# Patient Record
Sex: FEMALE | Race: WHITE | Employment: FULL TIME | URBAN - METROPOLITAN AREA
[De-identification: names, ages, dates, MRNs, and addresses within clinical notes are randomized per-mention and may not be internally consistent; named-entity substitution may affect disease eponyms.]

---

## 2019-07-13 LAB
CHLAMYDIA, EXTERNAL: NEGATIVE
HBSAG, EXTERNAL: NEGATIVE
HIV, EXTERNAL: NON REACTIVE
N. GONORRHEA, EXTERNAL: NEGATIVE
RUBELLA, EXTERNAL: 1.23
T. PALLIDUM, EXTERNAL: NON REACTIVE
TYPE, ABO & RH, EXTERNAL: NORMAL

## 2020-01-08 LAB — GRBS, EXTERNAL: NEGATIVE

## 2020-02-10 ENCOUNTER — HOSPITAL ENCOUNTER (INPATIENT)
Age: 25
LOS: 4 days | Discharge: HOME | End: 2020-02-14
Attending: OBSTETRICS & GYNECOLOGY | Admitting: OBSTETRICS & GYNECOLOGY
Payer: COMMERCIAL

## 2020-02-10 PROBLEM — Z3A.40 40 WEEKS GESTATION OF PREGNANCY: Status: ACTIVE | Noted: 2020-02-10

## 2020-02-10 LAB
ERYTHROCYTE [DISTWIDTH] IN BLOOD BY AUTOMATED COUNT: 14.2 % (ref 11.5–14.5)
HCT VFR BLD AUTO: 37.9 % (ref 35–47)
HGB BLD-MCNC: 11.7 G/DL (ref 11.5–16)
MCH RBC QN AUTO: 26.2 PG (ref 26–34)
MCHC RBC AUTO-ENTMCNC: 30.9 G/DL (ref 30–36.5)
MCV RBC AUTO: 84.8 FL (ref 80–99)
NRBC # BLD: 0 K/UL (ref 0–0.01)
NRBC BLD-RTO: 0 PER 100 WBC
PLATELET # BLD AUTO: 210 K/UL (ref 150–400)
PMV BLD AUTO: 12.5 FL (ref 8.9–12.9)
RBC # BLD AUTO: 4.47 M/UL (ref 3.8–5.2)
WBC # BLD AUTO: 11.8 K/UL (ref 3.6–11)

## 2020-02-10 PROCEDURE — 65270000029 HC RM PRIVATE

## 2020-02-10 PROCEDURE — 75410000002 HC LABOR FEE PER 1 HR

## 2020-02-10 PROCEDURE — 85027 COMPLETE CBC AUTOMATED: CPT

## 2020-02-10 PROCEDURE — 4A1HXCZ MONITORING OF PRODUCTS OF CONCEPTION, CARDIAC RATE, EXTERNAL APPROACH: ICD-10-PCS | Performed by: OBSTETRICS & GYNECOLOGY

## 2020-02-10 PROCEDURE — 3E0P7VZ INTRODUCTION OF HORMONE INTO FEMALE REPRODUCTIVE, VIA NATURAL OR ARTIFICIAL OPENING: ICD-10-PCS | Performed by: OBSTETRICS & GYNECOLOGY

## 2020-02-10 PROCEDURE — 74011250637 HC RX REV CODE- 250/637: Performed by: OBSTETRICS & GYNECOLOGY

## 2020-02-10 RX ORDER — DIPHENHYDRAMINE HCL 25 MG
50 CAPSULE ORAL
Status: DISCONTINUED | OUTPATIENT
Start: 2020-02-10 | End: 2020-02-14 | Stop reason: HOSPADM

## 2020-02-10 RX ORDER — OXYTOCIN/0.9 % SODIUM CHLORIDE 30/500 ML
0-25 PLASTIC BAG, INJECTION (ML) INTRAVENOUS
Status: DISCONTINUED | OUTPATIENT
Start: 2020-02-11 | End: 2020-02-14 | Stop reason: HOSPADM

## 2020-02-10 RX ORDER — ZOLPIDEM TARTRATE 5 MG/1
5 TABLET ORAL
Status: ACTIVE | OUTPATIENT
Start: 2020-02-10 | End: 2020-02-11

## 2020-02-10 RX ORDER — TERBUTALINE SULFATE 1 MG/ML
0.25 INJECTION SUBCUTANEOUS AS NEEDED
Status: DISCONTINUED | OUTPATIENT
Start: 2020-02-10 | End: 2020-02-12 | Stop reason: HOSPADM

## 2020-02-10 RX ORDER — BUTORPHANOL TARTRATE 1 MG/ML
1 INJECTION INTRAMUSCULAR; INTRAVENOUS
Status: DISCONTINUED | OUTPATIENT
Start: 2020-02-10 | End: 2020-02-12 | Stop reason: HOSPADM

## 2020-02-10 RX ADMIN — DINOPROSTONE 10 MG: 10 INSERT VAGINAL at 16:44

## 2020-02-10 NOTE — H&P
History & Physical    Name: Mendel Sprain MRN: [id number]  SSN: [id number]    YOB: 1995  Age: 22 y.o. Sex: female      Subjective:     Estimated Date of Delivery: 20  OB History    Para Term  AB Living   1             SAB TAB Ectopic Molar Multiple Live Births                    # Outcome Date GA Lbr Ruben/2nd Weight Sex Delivery Anes PTL Lv   1 Current                Ms. Dereje Dhillon is admitted with pregnancy at 40w5d for elective induction of labor at term. Prenatal course was normal.  Please see prenatal records for details. No past medical history on file. No past surgical history on file. Social History     Occupational History    Not on file   Tobacco Use    Smoking status: Never Smoker   Substance and Sexual Activity    Alcohol use: Not on file    Drug use: Not on file    Sexual activity: Not on file     Family History   Problem Relation Age of Onset    Heart Disease Father         ? jproblem in his 42's, detaills unknown       No Known Allergies  Prior to Admission medications    Medication Sig Start Date End Date Taking? Authorizing Provider   DESOGESTREL-ETHINYL ESTRADIOL (RECLIPSEN, 28, PO) Take  by mouth. Provider, Historical        Review of Systems: A comprehensive review of systems was negative except for that written in the History of Present Illness. Objective:     Vitals:  Vitals:    02/10/20 1616   Weight: 90.7 kg (200 lb)   Height: 5' 3\" (1.6 m)        Physical Exam:  Patient without distress.   Lung: normal respiratory effort  Abdomen: soft, nontender  Fundus: soft and non tender  Perineum: blood absent, amniotic fluid absent  Cervical Exam: 2/50/-3  Lower Extremities:  - Edema No  Membranes:  Intact  Fetal Heart Rate: Baseline 150, moderate variability, +accels, -decels    Prenatal Labs:   No results found for: ABORH, RUBELLAEXT, HBSAGEXT, HIVEXT, RPREXT, GONNOEXT, CHLAMEXT, ABORHEXT, RUBELLAEXT, GRBSEXT, HBSAGEXT, HIVEXT, RPREXT, GONNOEXT, CHLAMEXT  A+  GBS neg    Impression/Plan:     Active Problems:    * No active hospital problems. *    23 y/o  with IUP at 40 5/7 wks, elective IOL. GBS neg. Plan: Admit for induction of labor. Cervidil placed for ripening.  Plan pitocin in am.

## 2020-02-10 NOTE — PROGRESS NOTES
2/10/2020  4:12 PM Pt rec for post dates induction, G1, 40.5 weeks, Dr Martín Little on call, denies comp of pregnancy.  Consent signed and Pt to bathroom to change  1630 PIV and labs  1645 Dr. Martín Little at bedside, SVE, cervidil placed  1700 admision complete

## 2020-02-11 ENCOUNTER — ANESTHESIA (OUTPATIENT)
Dept: LABOR AND DELIVERY | Age: 25
End: 2020-02-11
Payer: COMMERCIAL

## 2020-02-11 ENCOUNTER — ANESTHESIA EVENT (OUTPATIENT)
Dept: LABOR AND DELIVERY | Age: 25
End: 2020-02-11
Payer: COMMERCIAL

## 2020-02-11 PROCEDURE — A4300 CATH IMPL VASC ACCESS PORTAL: HCPCS

## 2020-02-11 PROCEDURE — 74011000250 HC RX REV CODE- 250: Performed by: ANESTHESIOLOGY

## 2020-02-11 PROCEDURE — 74011250636 HC RX REV CODE- 250/636: Performed by: ANESTHESIOLOGY

## 2020-02-11 PROCEDURE — 76060000078 HC EPIDURAL ANESTHESIA

## 2020-02-11 PROCEDURE — 77030019905 HC CATH URETH INTMIT MDII -A

## 2020-02-11 PROCEDURE — 3E033VJ INTRODUCTION OF OTHER HORMONE INTO PERIPHERAL VEIN, PERCUTANEOUS APPROACH: ICD-10-PCS | Performed by: OBSTETRICS & GYNECOLOGY

## 2020-02-11 PROCEDURE — 77030014125 HC TY EPDRL BBMI -B: Performed by: ANESTHESIOLOGY

## 2020-02-11 PROCEDURE — 74011250636 HC RX REV CODE- 250/636: Performed by: OBSTETRICS & GYNECOLOGY

## 2020-02-11 PROCEDURE — 65270000029 HC RM PRIVATE

## 2020-02-11 PROCEDURE — 75410000002 HC LABOR FEE PER 1 HR

## 2020-02-11 PROCEDURE — 10907ZC DRAINAGE OF AMNIOTIC FLUID, THERAPEUTIC FROM PRODUCTS OF CONCEPTION, VIA NATURAL OR ARTIFICIAL OPENING: ICD-10-PCS | Performed by: OBSTETRICS & GYNECOLOGY

## 2020-02-11 PROCEDURE — 77030012890

## 2020-02-11 PROCEDURE — 74011250636 HC RX REV CODE- 250/636: Performed by: ADVANCED PRACTICE MIDWIFE

## 2020-02-11 PROCEDURE — 77030010848 HC CATH INTUTR PRSS KOLB -B

## 2020-02-11 PROCEDURE — 74011250636 HC RX REV CODE- 250/636

## 2020-02-11 PROCEDURE — 77030040830 HC CATH URETH FOL MDII -A

## 2020-02-11 PROCEDURE — 00HU33Z INSERTION OF INFUSION DEVICE INTO SPINAL CANAL, PERCUTANEOUS APPROACH: ICD-10-PCS | Performed by: ANESTHESIOLOGY

## 2020-02-11 RX ORDER — ONDANSETRON 2 MG/ML
INJECTION INTRAMUSCULAR; INTRAVENOUS
Status: COMPLETED
Start: 2020-02-11 | End: 2020-02-11

## 2020-02-11 RX ORDER — LIDOCAINE HYDROCHLORIDE AND EPINEPHRINE 15; 5 MG/ML; UG/ML
INJECTION, SOLUTION EPIDURAL AS NEEDED
Status: DISCONTINUED | OUTPATIENT
Start: 2020-02-11 | End: 2020-02-12 | Stop reason: HOSPADM

## 2020-02-11 RX ORDER — SODIUM CHLORIDE, SODIUM LACTATE, POTASSIUM CHLORIDE, CALCIUM CHLORIDE 600; 310; 30; 20 MG/100ML; MG/100ML; MG/100ML; MG/100ML
125 INJECTION, SOLUTION INTRAVENOUS CONTINUOUS
Status: DISCONTINUED | OUTPATIENT
Start: 2020-02-11 | End: 2020-02-14 | Stop reason: HOSPADM

## 2020-02-11 RX ORDER — NALOXONE HYDROCHLORIDE 0.4 MG/ML
0.4 INJECTION, SOLUTION INTRAMUSCULAR; INTRAVENOUS; SUBCUTANEOUS AS NEEDED
Status: DISCONTINUED | OUTPATIENT
Start: 2020-02-11 | End: 2020-02-12 | Stop reason: HOSPADM

## 2020-02-11 RX ORDER — EPHEDRINE SULFATE/0.9% NACL/PF 50 MG/5 ML
10 SYRINGE (ML) INTRAVENOUS
Status: COMPLETED | OUTPATIENT
Start: 2020-02-11 | End: 2020-02-12

## 2020-02-11 RX ORDER — ONDANSETRON 2 MG/ML
4 INJECTION INTRAMUSCULAR; INTRAVENOUS
Status: DISCONTINUED | OUTPATIENT
Start: 2020-02-11 | End: 2020-02-14 | Stop reason: HOSPADM

## 2020-02-11 RX ORDER — BUPIVACAINE HYDROCHLORIDE 2.5 MG/ML
25 INJECTION, SOLUTION EPIDURAL; INFILTRATION; INTRACAUDAL ONCE
Status: ACTIVE | OUTPATIENT
Start: 2020-02-11 | End: 2020-02-11

## 2020-02-11 RX ORDER — FENTANYL CITRATE 50 UG/ML
100 INJECTION, SOLUTION INTRAMUSCULAR; INTRAVENOUS ONCE
Status: DISPENSED | OUTPATIENT
Start: 2020-02-11 | End: 2020-02-11

## 2020-02-11 RX ORDER — BUPIVACAINE HYDROCHLORIDE 2.5 MG/ML
INJECTION, SOLUTION EPIDURAL; INFILTRATION; INTRACAUDAL
Status: COMPLETED
Start: 2020-02-11 | End: 2020-02-11

## 2020-02-11 RX ORDER — FENTANYL CITRATE 50 UG/ML
INJECTION, SOLUTION INTRAMUSCULAR; INTRAVENOUS AS NEEDED
Status: DISCONTINUED | OUTPATIENT
Start: 2020-02-11 | End: 2020-02-12 | Stop reason: HOSPADM

## 2020-02-11 RX ORDER — BUPIVACAINE HYDROCHLORIDE 2.5 MG/ML
INJECTION, SOLUTION EPIDURAL; INFILTRATION; INTRACAUDAL
Status: COMPLETED | OUTPATIENT
Start: 2020-02-11 | End: 2020-02-11

## 2020-02-11 RX ORDER — SODIUM CHLORIDE, SODIUM LACTATE, POTASSIUM CHLORIDE, CALCIUM CHLORIDE 600; 310; 30; 20 MG/100ML; MG/100ML; MG/100ML; MG/100ML
100 INJECTION, SOLUTION INTRAVENOUS CONTINUOUS
Status: DISCONTINUED | OUTPATIENT
Start: 2020-02-11 | End: 2020-02-12 | Stop reason: HOSPADM

## 2020-02-11 RX ORDER — FENTANYL/BUPIVACAINE/NS/PF 2-1250MCG
8 PREFILLED PUMP RESERVOIR EPIDURAL CONTINUOUS
Status: DISCONTINUED | OUTPATIENT
Start: 2020-02-11 | End: 2020-02-14 | Stop reason: HOSPADM

## 2020-02-11 RX ADMIN — SODIUM CHLORIDE, SODIUM LACTATE, POTASSIUM CHLORIDE, AND CALCIUM CHLORIDE 125 ML/HR: 600; 310; 30; 20 INJECTION, SOLUTION INTRAVENOUS at 13:24

## 2020-02-11 RX ADMIN — SODIUM CHLORIDE, SODIUM LACTATE, POTASSIUM CHLORIDE, AND CALCIUM CHLORIDE 125 ML/HR: 600; 310; 30; 20 INJECTION, SOLUTION INTRAVENOUS at 21:06

## 2020-02-11 RX ADMIN — BUPIVACAINE HYDROCHLORIDE 2 ML: 2.5 INJECTION, SOLUTION EPIDURAL; INFILTRATION; INTRACAUDAL; PERINEURAL at 14:15

## 2020-02-11 RX ADMIN — ONDANSETRON 4 MG: 2 INJECTION INTRAMUSCULAR; INTRAVENOUS at 18:14

## 2020-02-11 RX ADMIN — BUPIVACAINE HYDROCHLORIDE 3 ML: 2.5 INJECTION, SOLUTION EPIDURAL; INFILTRATION; INTRACAUDAL; PERINEURAL at 14:18

## 2020-02-11 RX ADMIN — Medication 10 ML/HR: at 14:23

## 2020-02-11 RX ADMIN — SODIUM CHLORIDE, SODIUM LACTATE, POTASSIUM CHLORIDE, AND CALCIUM CHLORIDE 125 ML/HR: 600; 310; 30; 20 INJECTION, SOLUTION INTRAVENOUS at 06:00

## 2020-02-11 RX ADMIN — Medication 10 ML/HR: at 23:07

## 2020-02-11 RX ADMIN — LIDOCAINE HYDROCHLORIDE,EPINEPHRINE BITARTRATE 5 ML: 15; .005 INJECTION, SOLUTION EPIDURAL; INFILTRATION; INTRACAUDAL; PERINEURAL at 14:18

## 2020-02-11 RX ADMIN — SODIUM CHLORIDE, SODIUM LACTATE, POTASSIUM CHLORIDE, AND CALCIUM CHLORIDE 300 ML: 600; 310; 30; 20 INJECTION, SOLUTION INTRAVENOUS at 18:51

## 2020-02-11 RX ADMIN — OXYTOCIN-SODIUM CHLORIDE 0.9% IV SOLN 30 UNIT/500ML 2 MILLI-UNITS/MIN: 30-0.9/5 SOLUTION at 06:10

## 2020-02-11 RX ADMIN — SODIUM CHLORIDE, SODIUM LACTATE, POTASSIUM CHLORIDE, AND CALCIUM CHLORIDE 125 ML/HR: 600; 310; 30; 20 INJECTION, SOLUTION INTRAVENOUS at 14:57

## 2020-02-11 RX ADMIN — FENTANYL CITRATE 100 MCG: 50 INJECTION, SOLUTION INTRAMUSCULAR; INTRAVENOUS at 14:18

## 2020-02-11 RX ADMIN — BUTORPHANOL TARTRATE 1 MG: 1 INJECTION, SOLUTION INTRAMUSCULAR; INTRAVENOUS at 12:53

## 2020-02-11 NOTE — PROGRESS NOTES
Patient is comfortable. FHTs 140/mod bozena/+accels/+earlies  TOCO q 2-6 minutes  Cx 5/90/-1  Pit @     Will continue with position changes.

## 2020-02-11 NOTE — PROGRESS NOTES
Received sign out from Ms. Irene Chang.  Patient is a 23 yo G1 @ 40 6/7 wks undergoing IOL for dates. Patient is comfortable at the moment.     FHTs baseline 145, mod variability, accels present, decels absent  TOCO q 2-3 minutes  Cx 3-4/50/-3  Pit @ 6    Cont to titrate pitocin  Pt ultimately plans epidural for pain control  Encouraged OOB, ambulation and position change

## 2020-02-11 NOTE — PROGRESS NOTES
RN has stopped pitocin secondary to decels, variable in nature early in timing. Moderate variability remains along with some accels. D/w patient trying an amnioinfusion. She is agreeable. IUPC placed without difficulty. Will bolus with 300 cc and then cont rate @ 75 cc/hr. Cervix is unchanged.

## 2020-02-11 NOTE — PROGRESS NOTES
0740 Bedside and Verbal shift change report given to Shan RN (oncoming nurse) by [facility]  RN (offgoing nurse). Report included the following information SBAR, Kardex, Intake/Output, MAR, Accordion, Recent Results and Med Rec Status. 2454 Dr Kassidy Ruelas at bedside to see patient and discuss plan of care. SVE 3-4/50/-3.     1236 Dr Kassidy Ruelas at bedside. SVE 4/75/-2. AROM, moderate amount of clear fluid. 1332 Patient requesting epidural, IVF bolus started. [place] 192 Dr Adolfo Patel at bedside to place epidural, patient tolerated well. 1738 SVE by Dr Kassidy Ruelas, 5/90/-1.    Berl Sermon Dr Kassidy Ruelas at bedside, IUPC placed and amnioinfusion placed. 300cc bolus followed by 75 ml/hr. 1940 Bedside and Verbal shift change report given to Genaro RN (oncoming nurse) by Shan RN (offgoing nurse). Report included the following information SBAR, Kardex, Intake/Output, MAR, Accordion, Recent Results and Med Rec Status.

## 2020-02-11 NOTE — PROGRESS NOTES
Patient is beginning to feel more cramps. Considering IV pain meds.     FHTs baseline 150, mod bozena, accels present, decels absent  TOCO difficult tracing with mobile monitor, ~q3-4 minutes  Pit @ 6  Cx 4/75/-2, AROM for mod amount of clear fluid    Cont to titrate pit  IV pain meds prn  Epidural when pt desires

## 2020-02-11 NOTE — PROGRESS NOTES
Labor Progress Note    S: Patient seen, fetal heart rate and contraction pattern evaluated. Resting comfortably in bed. Partner at bedside.      Physical Exam:  Patient Vitals for the past 4 hrs:   Temp Pulse Resp BP   02/10/20 1941 97.9 °F (36.6 °C) 98 14 118/79   02/10/20 1643 98.6 °F (37 °C) 94 14 117/83         Cervical Exam: deferred, cervidil in place  Membranes:  Intact  Uterine Contractions:  Frequency: occasional, mild  Fetal Heart Rate: Reactive, 135s, +accels, neg decels, moderate variability      Assessment/Plan:    22 y.o.  at 40w5d IUP  Elective IOL  Cat 1 tracing  GBS negative    P:  Assumed care of patient  Introduced self to patient and partner  Continue present management  Continuous monitoring  All questions asked/answered and patient agrees with plan    Christopher Cleaning CNM

## 2020-02-12 PROCEDURE — 65410000002 HC RM PRIVATE OB

## 2020-02-12 PROCEDURE — 75410000002 HC LABOR FEE PER 1 HR

## 2020-02-12 PROCEDURE — 75410000000 HC DELIVERY VAGINAL/SINGLE

## 2020-02-12 PROCEDURE — 74011000250 HC RX REV CODE- 250: Performed by: ANESTHESIOLOGY

## 2020-02-12 PROCEDURE — 75410000003 HC RECOV DEL/VAG/CSECN EA 0.5 HR

## 2020-02-12 PROCEDURE — 74011250637 HC RX REV CODE- 250/637: Performed by: MIDWIFE

## 2020-02-12 RX ORDER — HYDROCORTISONE ACETATE PRAMOXINE HCL 2.5; 1 G/100G; G/100G
CREAM TOPICAL AS NEEDED
Status: DISCONTINUED | OUTPATIENT
Start: 2020-02-12 | End: 2020-02-14 | Stop reason: HOSPADM

## 2020-02-12 RX ORDER — IBUPROFEN 400 MG/1
800 TABLET ORAL EVERY 8 HOURS
Status: DISCONTINUED | OUTPATIENT
Start: 2020-02-12 | End: 2020-02-14 | Stop reason: HOSPADM

## 2020-02-12 RX ORDER — ACETAMINOPHEN 325 MG/1
650 TABLET ORAL
Status: DISCONTINUED | OUTPATIENT
Start: 2020-02-12 | End: 2020-02-14 | Stop reason: HOSPADM

## 2020-02-12 RX ORDER — SODIUM CHLORIDE 0.9 % (FLUSH) 0.9 %
5-40 SYRINGE (ML) INJECTION AS NEEDED
Status: DISCONTINUED | OUTPATIENT
Start: 2020-02-12 | End: 2020-02-14 | Stop reason: HOSPADM

## 2020-02-12 RX ORDER — OXYTOCIN/RINGER'S LACTATE 20/1000 ML
999 PLASTIC BAG, INJECTION (ML) INTRAVENOUS AS NEEDED
Status: DISCONTINUED | OUTPATIENT
Start: 2020-02-12 | End: 2020-02-14 | Stop reason: HOSPADM

## 2020-02-12 RX ORDER — OXYCODONE AND ACETAMINOPHEN 5; 325 MG/1; MG/1
1 TABLET ORAL
Status: DISCONTINUED | OUTPATIENT
Start: 2020-02-12 | End: 2020-02-14 | Stop reason: HOSPADM

## 2020-02-12 RX ORDER — ONDANSETRON 4 MG/1
4 TABLET, ORALLY DISINTEGRATING ORAL
Status: DISCONTINUED | OUTPATIENT
Start: 2020-02-12 | End: 2020-02-14 | Stop reason: HOSPADM

## 2020-02-12 RX ORDER — AMMONIA 15 % (W/V)
1 AMPUL (EA) INHALATION AS NEEDED
Status: DISCONTINUED | OUTPATIENT
Start: 2020-02-12 | End: 2020-02-14 | Stop reason: HOSPADM

## 2020-02-12 RX ORDER — DOCUSATE SODIUM 100 MG/1
100 CAPSULE, LIQUID FILLED ORAL
Status: DISCONTINUED | OUTPATIENT
Start: 2020-02-12 | End: 2020-02-14 | Stop reason: HOSPADM

## 2020-02-12 RX ORDER — HYDROCORTISONE 1 %
CREAM (GRAM) TOPICAL AS NEEDED
Status: DISCONTINUED | OUTPATIENT
Start: 2020-02-12 | End: 2020-02-14 | Stop reason: HOSPADM

## 2020-02-12 RX ORDER — OXYTOCIN/RINGER'S LACTATE 20/1000 ML
0-25 PLASTIC BAG, INJECTION (ML) INTRAVENOUS AS NEEDED
Status: DISCONTINUED | OUTPATIENT
Start: 2020-02-12 | End: 2020-02-14 | Stop reason: HOSPADM

## 2020-02-12 RX ORDER — SIMETHICONE 80 MG
80 TABLET,CHEWABLE ORAL
Status: DISCONTINUED | OUTPATIENT
Start: 2020-02-12 | End: 2020-02-14 | Stop reason: HOSPADM

## 2020-02-12 RX ORDER — DIPHENHYDRAMINE HCL 25 MG
25 CAPSULE ORAL
Status: DISCONTINUED | OUTPATIENT
Start: 2020-02-12 | End: 2020-02-14 | Stop reason: HOSPADM

## 2020-02-12 RX ORDER — ZOLPIDEM TARTRATE 5 MG/1
5 TABLET ORAL
Status: DISCONTINUED | OUTPATIENT
Start: 2020-02-12 | End: 2020-02-14 | Stop reason: HOSPADM

## 2020-02-12 RX ADMIN — IBUPROFEN 800 MG: 400 TABLET, FILM COATED ORAL at 17:00

## 2020-02-12 RX ADMIN — IBUPROFEN 800 MG: 400 TABLET, FILM COATED ORAL at 08:27

## 2020-02-12 RX ADMIN — Medication 10 MG: at 00:22

## 2020-02-12 NOTE — PROGRESS NOTES
Bedside and Verbal shift change report given to Jean Pierre Stapleton (oncoming nurse) by CHAPINCITO Alonzo RN (offgoing nurse). Report included the following information SBAR.

## 2020-02-12 NOTE — ROUTINE PROCESS
Bedside and Verbal shift change report given to CHAPINCITO Alonzo RN (oncoming nurse) by TAI Zuniga RN (offgoing nurse).  Report included the following information SBAR

## 2020-02-12 NOTE — PROGRESS NOTES
193: Bedside and Verbal shift change report given to Mihaela Holland RN and MARK Singleton student nurse (oncoming nurse) by Tam Claudio RN (offgoing nurse). Report included the following information SBAR, Intake/Output, MAR and Recent Results. 0015: Epidural stopped. Dermatome level T4 and BP 80's/50's. Telephone order with readback from Dr Suzy Coles to decrease epidural dose to 8ml/hr and give 10mg ephedrine IV.    0205:  SVE: Complete  0215:  Begin pushing  9340-0802:  RN at bedside continuously pushing with pt. -150 with moderate variability. Variables noted with contractions that resolve spontaneously. 8692-2142:   RN at bedside continuously pushing with pt. -150 with moderate variability. Variables noted with contractions that resolve spontaneously. 6099-1199: Left sided pelvic release  5857-5632: Right sided pelvic release  5907-1497: RN at bedside. Resumed pushing with pt.  with moderate variability. Variables noted with contractions that resolve spontaneously. 8774-5167: RN at bedside. Continue pushing with pt.  with moderate variability. Variables noted with contractions that resolve spontaneously. [id number] Ferreira at bedside for delivery   5941:   vigorous female  placed on mom's chest        Faculty or Preceptor Review    2020  - Shift times - 1930 to 0700      The documentation of patient care for Angelina Andersen has been reviewed and approved.      Lucille Eden

## 2020-02-12 NOTE — LACTATION NOTE
This note was copied from a baby's chart. Baby nursing well after delivery, deep latch obtained, mother is comfortable, baby feeding vigorously with rhythmic suck, swallow, breathe pattern, both breasts offered, baby is skin to skin for feeding. Mother is very pleased with baby's progress. This is her first baby. She plans to breastfeed as long as she is able.

## 2020-02-12 NOTE — PROCEDURES
CNM Delivery Note       Patient: Delgado Jain MRN: [id number]  SSN: [id number]    YOB: 1995  Age: 22 y.o. Sex: female      Delivery Information Summary    Boyd Hutton is a 22 y.o. female admitted for 40 weeks gestation of pregnancy [Z3A.40]. Maternal History:     Patient Active Problem List    Diagnosis Date Noted    40 weeks gestation of pregnancy 02/10/2020    Chest pain on breathing 2016      Alicia Sánchez NP  Past Medical History:   Diagnosis Date    Phlebitis and thrombophlebitis       Past Surgical History:   Procedure Laterality Date    HX OTHER SURGICAL  2019    R Wrist FX     No Known Allergies   Family History   Problem Relation Age of Onset    Heart Disease Father         ? jproblem in his 42's, detaills unknown        Delivery Narrative:     Delivery Summary    Patient: Delgado Jain MRN: [id number]  SSN: [id number]    YOB: 1995  Age: 22 y.o. Sex: female       Information for the patient's :  Alida Negro [[id number]]       Labor Events:    Labor: No    Steroids: None   Cervical Ripening Date/Time: 2/10/2020 4:45 PM   Cervical Ripening Type: Cervidil   Antibiotics During Labor: No   Rupture Identifier:      Rupture Date/Time: 2020 12:36 PM   Rupture Type: AROM   Amniotic Fluid Volume: Moderate    Amniotic Fluid Description: Clear    Amniotic Fluid Odor: None    Induction: Oxytocin       Induction Date/Time: 2020 6:10 AM    Indications for Induction: Post-term Gestation    Augmentation: AROM   Augmentation Date/Time: 202012:36 PM   Indications for Augmentation: Ineffective Contraction Pattern   Labor complications:          Additional complications:        Delivery Events:  Indications For Episiotomy:     Episiotomy: None   Perineal Laceration(s): 1st   Repaired: No    Periurethral Laceration Location:      Repaired:     Labial Laceration Location:     Repaired:     Sulcal Laceration Location:     Repaired: Vaginal Laceration Location:     Repaired:     Cervical Laceration Location:     Repaired:     Repair Suture: None   Number of Repair Packets:     Estimated Blood Loss (ml): 200ml     Delivery Date: 2020    Delivery Time: 4:52 AM  Delivery Type: Vaginal, Spontaneous  Sex:  Female    Gestational Age: 37w0d   Delivery Clinician:  Wilian Cohen  Living Status: Living   Delivery Location: L&D            APGARS  One minute Five minutes Ten minutes   Skin color: 1   1        Heart rate: 2   2        Grimace: 2   2        Muscle tone: 2   2        Breathin   2        Totals: 9   9            Presentation: Vertex    Position:   Occiput Anterior  Resuscitation Method:  Tactile Stimulation     Meconium Stained: None      Cord Information: 3 Vessels  Complications: None  Cord around:    Delayed cord clamping? Yes  Cord clamped date/time:   Disposition of Cord Blood: Discard    Blood Gases Sent?: No    Placenta:  Date/Time: 2020  4:58 AM  Removal: Spontaneous      Appearance: Normal;Other (comment)     Pruden Measurements:  Birth Weight:        Birth Length:        Head Circumference:        Chest Circumference:       Abdominal Girth: Other Providers:   ;HASEEB Nascimento;;;;;;Michaelle CABRERA, Obstetrician;Primary Nurse;Primary  Nurse;Nicu Nurse;Neonatologist;Anesthesiologist;Crna;Nurse Practitioner;Midwife;Nursery Nurse           Group B Strep:   Lab Results   Component Value Date/Time    Sharrontrep, External negative 2020     Maternal A positive    Delivery Summary:                        Complete cervical dilation at 0205  of a live female infant at 200 in Occiput Anterior position under epidural anesthesia with mother in semifowlers position. Shoulders easily and spontaneously delivered with maternal effort. Vigorous infant placed skin to skin on maternal abdomen immediately following delivery.     Third Stage:  Pitocin infusion bolus after delivery of infant for active management of the third stage. Placenta and membranes: spontaneous    Placenta Appearance: intact Other marginal cord insertion, short cord  Umbilical Cord: 3 vessels present via [place] Nelli mechanism at 7445. Fundus noted as firm. Mother and infant stable, bonding, establishing breastfeeding.            Collaborative MD: Joel He      Signed By:  Stephenie Castro CNM      2/12/2020 5:16 AM

## 2020-02-12 NOTE — PROGRESS NOTES
CNM Labor Progress Note     Patient: Mendel Sprain MRN: [id number]  SSN: [id number]    YOB: 1995  Age: 22 y.o. Sex: female        Subjective:   Patient coping well with contractions with epidural; using peanut ball, sidelying;    Objective:   Blood pressure 91/53, pulse 74, temperature 98.7 °F (37.1 °C), resp. rate 16, height 5' 3\" (1.6 m), weight 90.7 kg (200 lb), SpO2 98 %, currently breastfeeding. Patient Vitals for the past 4 hrs:    Mode Fetal Heart Rate Fetal Activity Variability Decelerations Accelerations RN Reviewed Strip?   20 2330 External 140 Present 6-25 BPM None Yes Yes   20 2315 External 125     Yes   20 2300 External 135 Present 6-25 BPM  Yes Yes   20 2245 External 135     Yes   20 2231 External 140 Present 6-25 BPM None Yes Yes   20 2215 External 150     Yes   20 2200 External 125 Present 6-25 BPM Variable Yes Yes   20 2144 External 100     Yes   20 2131 External 125 Present 6-25 BPM None Yes Yes   20 2116 External 130     Yes   20 2109 External 125 (P) Present 6-25 BPM Variable Yes Yes   20 2045 External 130     Yes   20 2030 External 130 (P) Present 6-25 BPM Early;Variable Yes Yes     Uterine contractions q 2-3 minutes, 60-90s, strong to palpation, resting tone soft;    Sterile Vaginal Exam: /+1 (by NADER Mosher)    Assessment:    SIUP @ 41w0d by Estimated Date of Delivery: 20  Category 2 fetal heart rate tracing   Laboring     Plan:   Assuming care of patient from Dr. Sarah Beth Noble current orders/management    -continue amnioinfusion @ 75ml/hr   -Pain management epidural  CNM management   Anticipate     Collaborative MD: Paulette Frye    Signed By:  Darian Blount CNM      2020 12:14 AM No

## 2020-02-12 NOTE — PROGRESS NOTES
Bedside and Verbal shift change report given to Gladys Haider RN and Jr Irving (student nurse) (oncoming nurse) by Cayla Hsu RN  (offgoing nurse). Report included the following information SBAR, Kardex, Intake/Output, MAR and Recent Results. TRANSFER - OUT REPORT:    Verbal report given to CELESTINO Jacobsen RN (name) on Martinez Jennings  being transferred to [facility] (unit) for routine progression of care       Report consisted of patients Situation, Background, Assessment and   Recommendations(SBAR). Information from the following report(s) SBAR, Kardex, Intake/Output, MAR and Recent Results was reviewed with the receiving nurse. Lines:   Peripheral IV 02/10/20 Anterior; Left Forearm (Active)   Site Assessment Clean, dry, & intact 2/12/2020  4:00 AM   Phlebitis Assessment 0 2/12/2020  4:00 AM   Infiltration Assessment 0 2/12/2020  4:00 AM   Dressing Status Clean, dry, & intact 2/12/2020  4:00 AM   Dressing Type Tape;Transparent 2/12/2020  4:00 AM   Hub Color/Line Status Infusing;Green 2/12/2020  4:00 AM        Opportunity for questions and clarification was provided.       Patient transported with:   Registered Nurse

## 2020-02-12 NOTE — ROUTINE PROCESS
5982: TRANSFER - IN REPORT: 
 
Verbal report received from Mary Jo Wiley RN and CHANTALE Singleton student nurse (name) on Guillermo Crockett  being received from L&D (unit) for routine progression of care Report consisted of patients Situation, Background, Assessment and  
Recommendations(SBAR). Information from the following report(s) SBAR, Intake/Output and MAR was reviewed with the receiving nurse. Opportunity for questions and clarification was provided. Assessment completed upon patients arrival to unit and care assumed.

## 2020-02-12 NOTE — ADT AUTH CERT NOTES
Patient Demographics Patient Name Roberta Fischer [address] 
[id number] Sex Female  
1995 Address [place] Phone [phone] (Home) [phone] (Mobile) CSN:  
[id number] Admit Date: Admit Time Room Bed Feb 10, 2020  4:12  [[id number]] 01 [[id number]] Attending Providers Provider Pager From To  
Ricardo Abarca MD  02/10/20 02/12/20 Lady Negro MD  20 Emergency Contact(s) Name Relation Home Work Mobile None, None Unknown [phone] Utilization Reviews  
 
   
DOS 2/10/2020 IOL note by Curt Fischer  
 
   
Review Entered Review Status 2020 08:09 In Primary  
   
Criteria Review 2020 IOL note VS 98.6 94 14 117/83 WBC 11.8 (H)  
  
Cervadil Ms. Avila is admitted with pregnancy at 40w5d for elective induction of labor at term. Prenatal course was normal.  Please see prenatal records for details. 
  
Physical Exam: 
Mary Ano. Lung: normal respiratory effort Abdomen: soft, nontender Fundus: soft and non tender Perineum: blood absent, amniotic fluid absent Cervical Exam: 50/-3 Lower Extremities:  - Edema No 
Membranes:  Intact Fetal Heart Rate: Baseline 150, moderate variability, +accels, -decels Impression/Plan:  
  
Active Problems: 
  * No active hospital problems. * 
  
23 y/o  with IUP at 40 5/7 wks, elective IOL. GBS neg.  
  
Plan: Admit for induction of labor. Cervidil placed for ripening. Plan pitocin in am.  
  
  
   
H&P Notes  
 
 H&P by Ricardo Abarca MD at 02/10/20 1619 documented on Admission (Current) from 2/10/2020 in [address] Author: Ricardo Abarca MD Author Type: Physician Filed: 02/10/20 8933 Note Status: Signed Cosign: Cosign Not Required Date of Service: 02/10/20 1619 : Ricardo Abarca MD (Physician) History & Physical 
  
Name: Mendel Sprain MRN: [id number]  SSN: [id number] YOB: 1995  Age: 22 y.o. Sex: female   
  
Subjective:   Estimated Date of Delivery: 20 OB History  Para Term  AB Living 1 SAB TAB Ectopic Molar Multiple Live Births   
             
   
# Outcome Date GA Lbr Ruben/2nd Weight Sex Delivery Anes PTL Lv  
1 Current                    
  
  
Ms. America Betancourt is admitted with pregnancy at 40w5d for elective induction of labor at term. Prenatal course was normal.  Please see prenatal records for details. 
  
No past medical history on file. No past surgical history on file. Social History  
  
    
Occupational History  Not on file Tobacco Use  Smoking status: Never Smoker Substance and Sexual Activity  Alcohol use: Not on file  Drug use: Not on file  Sexual activity: Not on file  
  
     
Family History Problem Relation Age of Onset  Heart Disease Father    
      ? jproblem in his 42's, detaills unknown  
  
  
No Known Allergies Prior to Admission medications Medication Sig Start Date End Date Taking? Authorizing Provider DESOGESTREL-ETHINYL ESTRADIOL (RECLIPSEN, 28, PO) Take  by mouth.       Provider, Historical  
  
  
Review of Systems: A comprehensive review of systems was negative except for that written in the History of Present Illness. 
  
Objective:  
  
Vitals: 
   
Vitals:  
  02/10/20 1616 Weight: 90.7 kg (200 lb) Height: 5' 3\" (1.6 m)  
  
  
Physical Exam: 
Patient without distress. Lung: normal respiratory effort Abdomen: soft, nontender Fundus: soft and non tender Perineum: blood absent, amniotic fluid absent Cervical Exam: 250/-3 Lower Extremities:  - Edema No 
Membranes:  Intact Fetal Heart Rate: Baseline 150, moderate variability, +accels, -decels 
  
Prenatal Labs:  
No results found for: ABORH, RUBELLAEXT, HBSAGEXT, HIVEXT, RPREXT, GONNOEXT, CHLAMEXT, ABORHEXT, RUBELLAEXT, GRBSEXT, HBSAGEXT, HIVEXT, RPREXT, GONNOEXT, CHLAMEXT A+ 
GBS neg 
  
Impression/Plan:  
  
Active Problems: * No active hospital problems. * 
  
21 y/o  with IUP at 40 5/7 wks, elective IOL. GBS neg.  
  
Plan: Admit for induction of labor. Cervidil placed for ripening. Plan pitocin in am.  
  
   
  
 
 
 
MOM CHART- DELIVERED 20 VAGINAL Liban Zayas  
   
MRN: [id number] Link to [employer] Comment Last edited by  on  at [de-identified] name MRN Account  Age Sex Admission Idolina Severin Angel Daisy [id number] 20 0 days F Confirmed Admission - L&D [place] OB History Jaunita Candi 1 Para 1 Term 1   
0 AB  
0 Living  
1 SAB  
0  
 TAB  
0 Ectopic  
0 Molar  
0 Multiple  
0 Live Births 1  
  
   
# Outcome Date GA Labor/2nd Weight Sex Delivery Anes PTL Lv A1 A5  
1 Term 20 41w0d 12h 05m / 2h 47m 3.075 kg F Vag-Spont Epidural N Living 9 9 Name: Eleuteriokrysta Analisa Location: Other Delivering Clinician: Mei Benoit CNM Prenatal History Most Recent Value Did You Receive Prenatal Care Yes Name Of [facility] Provider Maribel Garza Seen By M (Maternal Fetal Medicine)? No  
Fetal Ultrasound Abnormalities/Concerns? No  
Infant Feeding Breast Milk Circumcision Planned No  
Pediatrician After Birth/ Follow Up Baby Visits undecided Dating Summary Working VERNELL: 20 set by Skip Razo RN on 02/10/20 based on Other Basis Based On VERNELL GA Dif GA User Date Other Basis  20 Working  Skip Razo RN 02/10/20 Prenatal Results Prenatal Labs Test Value Date Time ABO/Rh * A positive  19 Antibody Scrn Hgb Hct Platelet Rubella * 1.23  19 RPR     
T. Pallidum Antibody * non reactive  19 Urine Hep B Surf AG * negative  19 Hep C     
HIV * non reactive  19 Gonorrhea * negative  19 Chlamydia * negative  19 TSH     
GTT, 1 HR (Glucola) GTT, Fasting GTT, 1 HR     
GTT, 2 HR     
GTT, 3 HR     
3rd Trimester Test Value Date Time Hgb Hct Platelet Group B Strep * negative  20 Antibody Scrn     
TSH     
T. Pallidum Antibody Hep B Surf AG Gonorrhea Chlamydia Screening/Diagnostics Test Value Date Time  
AFP Only AFP Tetra Hgb Electrophoresis Amniocentesis Cystic Fibrosis Thalessemia Rafa-Sachs [place] PAP Smear FREE BETA HCG     
NT     
NIPT Additional Results Test Value Date Time GTT, Fasting GTT, 1HR     
GTT, 2HR     
GTT, 3HR     
RPR FREE BETA HCG     
CMV AB     
TOXOPLASMA AB     
VARICELLA ZOSTER AB Legend *: Historical  
View all results for this pregnancy Vicenta pearson [[id number]]  Delivery Birth date/time: 2020 04:52:00 Delivery type: Vaginal, Spontaneous Labor Event Times Labor onset date/time: 2020 1400 Dilation complete date/time: 2020 0205 Start pushing date/time: 2020 0215 Labor Events  labor?: No  
 steroids?: None Cervical ripenin/10/2020 1645 Cervical ripening type: Cervidil Antibiotics during labor?: No  
Rupture date/time: 2020 1236 Rupture type: AROM Fluid color: Clear Fluid odor: None Induction: Oxytocin Induction date/time: 2020 Induction indications: Post-term Gestation Augmentation: AROM Augmentation date/time: 2020 123 Augmentation indications: Ineffective Contraction Pattern Labor/Delivery complications: None Delivery Providers Delivering clinician: Imelda Robert CNM Provider Role Obstetrician  
Marko Severin Primary Nurse Joanie Gray, RN Primary [place] Nurse NICU Nurse Neonatologist  
 Anesthesiologist  
 CRNA Nurse Practitioner Imelda Robert CNM Midwife Nursery Nurse Chloe Chisholm Student Nurse Anesthesia Method: Epidural  
Multiple Birth Onset Second Stage Second Stage Start Date: 2/12/20 Second Stage Start Time: 0205 Operative Delivery Forceps attempted?: No  
Vacuum extractor attempted?: No  
Presentation Presentation: Vertex Position: Occiput Anterior Apgars Living status: Living Apgars:  1 min. :  5 min.:  10 min. :  15 min.:  20 min.:   
Skin color:  1  1 Heart rate:  2  2 Reflex irritability:  2  2 Muscle tone:  2  2 Respiratory effort:  2  2 Total:  9  9 Apgars assigned by: Doreen CR RN Resuscitation Method: Tactile Stimulation Shoulder Dystocia Shoulder dystocia present?: No  
   
   
   
   
   
   
   
   
   
   
   
   
   
   
   
   
   
   
   
   
Measurements Weight: 3075 g Length: 50.8 cm Head circumference: 34 cm Abdominal girth: 30 cm Lacerations Episiotomy: None Lacerations: 1st  
Repair Needed: None # of Repair Packets:   
Suture Type and Size:   
Suture Comment:   
Estimated Blood Loss (mL):    
Placenta Placenta Date/Time: 2/12/2020 8313 Removal: Spontaneous Appearance: Normal, Other (comment) Placenta disposition: Discarded Placenta Appearance Comment: marginal cord insertion, short cord Vaginal Counts Initial count personnel: Lisa GRIGGS RN Final count personnel: BECCA CABRERA CNM/ANALIA MINAYA Accurate final count?: Yes  
Skin to Skin Skin to skin initiated date/time: 2/12/2020 0452 Skin to skin with: Mother Delivery Summary History Event User Date/Time Signed Elan Strickland RN 2/12/2020 09:34:30 Opened for Addendum Elan Strickland RN 2/12/2020 09:36:09 Signed Elan Strickland RN 2/12/2020 09:37:00

## 2020-02-12 NOTE — PROGRESS NOTES
Post-Partum Day Number 0 Progress Note    Yudelka Sana Maura     Assessment: Doing well, post partum day 0 s/p TSVD at [address]     Plan:  1. Continue routine postpartum and perineal care as well as maternal education. 2.Female neonte, doing well    Information for the patient's :  Harper Gonzalez [[id number]]   Vaginal, Spontaneous    Subjective:  Patient doing well without significant complaint. Voiding without difficulty, normal lochia. Vitals:  Visit Vitals  /79 (BP 1 Location: Left arm, BP Patient Position: At rest)   Pulse 76   Temp 97.7 °F (36.5 °C)   Resp 18   Ht 5' 3\" (1.6 m)   Wt 90.7 kg (200 lb)   SpO2 96%   Breastfeeding Yes   BMI 35.43 kg/m²     Temp (24hrs), Av.4 °F (36.9 °C), Min:97.5 °F (36.4 °C), Max:98.7 °F (37.1 °C)        Exam:   Patient without distress. Abdomen soft, fundus firm, nontender                Perineum with normal lochia noted. Lower extremities are negative for swelling, cords or tenderness. Labs:     Lab Results   Component Value Date/Time    WBC 11.8 (H) 02/10/2020 04:38 PM    HGB 11.7 02/10/2020 04:38 PM    HCT 37.9 02/10/2020 04:38 PM    PLATELET 799  04:38 PM       No results found for this or any previous visit (from the past 24 hour(s)).

## 2020-02-13 PROCEDURE — 74011250637 HC RX REV CODE- 250/637: Performed by: MIDWIFE

## 2020-02-13 PROCEDURE — 65410000002 HC RM PRIVATE OB

## 2020-02-13 RX ORDER — IBUPROFEN 800 MG/1
800 TABLET ORAL
Qty: 30 TAB | Refills: 1 | Status: SHIPPED | OUTPATIENT
Start: 2020-02-13

## 2020-02-13 RX ADMIN — IBUPROFEN 800 MG: 400 TABLET, FILM COATED ORAL at 12:10

## 2020-02-13 RX ADMIN — IBUPROFEN 800 MG: 400 TABLET, FILM COATED ORAL at 20:05

## 2020-02-13 RX ADMIN — IBUPROFEN 800 MG: 400 TABLET, FILM COATED ORAL at 03:31

## 2020-02-13 NOTE — ROUTINE PROCESS
Verbal shift change report given to TAI Kaufman RN (oncoming nurse) by Hadley De La Cruz RN (offgoing nurse) since patient was sleeping. Report included the following information SBAR.

## 2020-02-13 NOTE — PROGRESS NOTES
Post-Partum Day Number 1 Progress Note    Yudelka Lechuga     Assessment: Doing well, post partum day 1    Plan:  1. Continue routine postpartum and perineal care as well as maternal education. 2. Stable LE varicosities. Heat, NSAIDs reviewed. Consider outpt follow up with vascular surgeon postpartum. 3. Anticipate discharge home in AM.     Information for the patient's :  Candido Patel [[id number]]   Vaginal, Spontaneous   Patient doing well without significant complaint. Voiding without difficulty, normal lochia. Varicose veins doing better. Vitals:  Visit Vitals  BP 91/88   Pulse 73   Temp 98.1 °F (36.7 °C)   Resp 18   Ht 5' 3\" (1.6 m)   Wt 90.7 kg (200 lb)   SpO2 96%   Breastfeeding Yes   BMI 35.43 kg/m²     Temp (24hrs), Av.1 °F (36.7 °C), Min:97.3 °F (36.3 °C), Max:98.6 °F (37 °C)        Exam:   Patient without distress. Abdomen soft, fundus firm, nontender                Perineum with normal lochia noted. Lower extremities are negative for swelling, cords or tenderness. Labs:     Lab Results   Component Value Date/Time    WBC 11.8 (H) 02/10/2020 04:38 PM    HGB 11.7 02/10/2020 04:38 PM    HCT 37.9 02/10/2020 04:38 PM    PLATELET 575  04:38 PM       No results found for this or any previous visit (from the past 24 hour(s)).

## 2020-02-13 NOTE — DISCHARGE INSTRUCTIONS
POSTPARTUM DISCHARGE INSTRUCTIONS       Name:  Maddy Solis  YOB: 1995  Admission Diagnosis:  40 weeks gestation of pregnancy [Z3A.40]     Discharge Diagnosis:    Problem List as of 2/13/2020 Date Reviewed: 5/12/2016          Codes Class Noted - Resolved    40 weeks gestation of pregnancy ICD-10-CM: Z3A.40  ICD-9-CM: V22.2  2/10/2020 - Present        Chest pain on breathing ICD-10-CM: R07.1  ICD-9-CM: 786.52  5/12/2016 - Present            Attending Physician:  Melo Fleming MD    Delivery Type:  Vaginal Childbirth: What To Expect At Home    Your Recovery: Your body will slowly heal in the next few weeks. It is easy to get too tired and overwhelmed during the first weeks after your baby is born. Changes in your hormones can shift your mood without warning. You may find it hard to meet the extra demands on your energy and time. Take it easy on yourself. Follow-up care is a key part of your treatment and safety. Be sure to make and go to all appointments, and call your doctor if you are having problems. It's also a good idea to know your test results and keep a list of the medicines you take. How can you care for yourself at home? Vaginal bleeding and cramps  · After delivery, you will have a bloody discharge from the vagina. This will turn pink within a week and then white or yellow after about 10 days. It may last for 2 to 4 weeks or longer, until the uterus has healed. Use pads instead of tampons until you stop bleeding. · Do not worry if you pass some blood clots, as long as they are smaller than a golf ball. If you have a tear or stitches in your vaginal area, change the pad at least every 4 hours to prevent soreness and infection. · You may have cramps for the first few days after childbirth. These are normal and occur as the uterus shrinks to normal size.  Take an over-the-counter pain medicine, such as acetaminophen (Tylenol), ibuprofen (Advil, Motrin), or naproxen (Aleve), for cramps. Read and follow all instructions on the label. Do not take aspirin, because it can cause more bleeding. Do not take acetaminophen (Tylenol) and other acetaminophen containing medications (i.e. Percocet) at the same time. Breast fullness  · Your breasts may overfill (engorge) in the first few days after delivery. To help milk flow and to relieve pain, warm your breasts in the shower or by using warm, moist towels before nursing. · If you are not nursing, do not put warmth on your breasts or touch your breasts. Wear a tight bra or sports bra and use ice until the fullness goes away. This usually takes 2 to 3 days. · Put ice or a cold pack on your breast after nursing to reduce swelling and pain. Put a thin cloth between the ice and your skin. Activity  · Eat a balanced diet. Do not try to lose weight by cutting calories. Keep taking your prenatal vitamins, or take a multivitamin. · Get as much rest as you can. Try to take naps when your baby sleeps during the day. · Get some exercise every day. But do not do any heavy exercise until your doctor says it is okay. · Wait until you are healed (about 4 to 6 weeks) before you have sexual intercourse. Your doctor will tell you when it is okay to have sex. · Talk to your doctor about birth control. You can get pregnant even before your period returns. Also, you can get pregnant while you are breast-feeding. Mental Health  · Many women get the \"baby blues\" during the first few days after childbirth. You may lose sleep, feel irritable, and cry easily. You may feel happy one minute and sad the next. Hormone changes are one cause of these emotional changes. Also, the demands of a new baby, along with visits from relatives or other family needs, add to a mother's stress. The \"baby blues\" often peak around the fourth day. Then they ease up in less than 2 weeks.   · If your moodiness or anxiety lasts for more than 2 weeks, or if you feel like life is not worth living, you may have postpartum depression. This is different for each mother. Some mothers with serious depression may worry intensely about their infant's well-being. Others may feel distant from their child. Some mothers might even feel that they might harm their baby. A mother may have signs of paranoia, wondering if someone is watching her. · With all the changes in your life, you may not know if you are depressed. Pregnancy sometimes causes changes in how you feel that are similar to the symptoms of depression. · Symptoms of depression include:  · Feeling sad or hopeless and losing interest in daily activities. These are the most common symptoms of depression. · Sleeping too much or not enough. · Feeling tired. You may feel as if you have no energy. · Eating too much or too little. · POSTPARTUM SUPPORT INTERNATIONAL (PSI) offers a Warm line; Chat with the Expert phone sessions; Information and Articles about Pregnancy and Postpartum Mood Disorders; Comprehensive List of Free Support Groups; Knowledgeable local coordinators who will offer support, information, and resources; Guide to Resources on [employer]; Calendar of events in the  mood disorders community; Latest News and Research; and [facility] [address] for [employer]. Remember - You are not alone; You are not to blame; With help, you will be well. 3-457-609-PPD(9749). WWW. POSTPARTUM. NET   · Writing or talking about death, such as writing suicide notes or talking about guns, knives, or pills. Keep the numbers for these national suicide hotlines: 8-432-907-TALK (0-958.945.9101) and 1-120-PYYDIFG (8-213.256.3405). If you or someone you know talks about suicide or feeling hopeless, get help right away. Constipation and Hemorrhoids  · Drink plenty of fluids, enough so that your urine is light yellow or clear like water.  If you have kidney, heart, or liver disease and have to limit fluids, talk with your doctor before you increase the amount of fluids you drink. · Eat plenty of fiber each day. Have a bran muffin or bran cereal for breakfast, and try eating a piece of fruit for a mid-afternoon snack. · For painful, itchy hemorrhoids, put ice or a cold pack on the area several times a day for 10 minutes at a time. Follow this by putting a warm compress on the area for another 10 to 20 minutes or by sitting in a shallow, warm bath. When should you call for help? Call 911 anytime you think you may need emergency care. For example, call if:  · You are thinking of hurting yourself, your baby, or anyone else. · You passed out (lost consciousness). · You have symptoms of a blood clot in your lung (called a pulmonary embolism). These may include:    · Sudden chest pain. · Trouble breathing. · Coughing up blood. Call your doctor now or seek immediate medical care if:  · You have severe vaginal bleeding. · You are soaking through a pad each hour for 2 or more hours. · Your vaginal bleeding seems to be getting heavier or is still bright red 4 days after delivery. · You are dizzy or lightheaded, or you feel like you may faint. · You are vomiting or cannot keep fluids down. · You have a fever. · You have new or more belly pain. · You pass tissue (not just blood). · Your vaginal discharge smells bad. · Your belly feels tender or full and hard. · Your breasts are continuously painful or red. · You feel sad, anxious, or hopeless for more than a few days. · You have sudden, severe pain in your belly. · You have symptoms of a blood clot in your leg (called a deep vein thrombosis),          such as:  · Pain in your calf, back of the knee, thigh, or groin. · Redness and swelling in your leg or groin. · You have symptoms of preeclampsia, such as:  · Sudden swelling of your face, hands, or feet. · New vision problems (such as dimness or blurring). · A severe headache.   · Your blood pressure is higher than it should be or rises suddenly. · You have new nausea or vomiting. Watch closely for changes in your health, and be sure to contact your doctor if you have any problems. Additional Information:  Postpartum Support    PARENTS:  Are you feeling sad or depressed? Is it difficult for you to enjoy yourself? Do you feel more irritable or tense? Do you feel anxious or panicky? Are you having difficulty bonding with your baby? Do you feel as if you are \"out of control\" or \"going crazy\"? Are you worried that you might hurt your baby or yourself? FAMILIES: Do you worry that something is wrong but don't know how to help? Do you think that your partner or spouse is having problems coping? Are you worried that it may never get better? While many women experience some mild mood change or \"the blues\" during or after the birth of a child, 1 in 9 women experience more significant symptoms of depression or anxiety. 1 in 10 Dads become depressed during the first year. Things you can do  Being a good parent includes taking care of yourself. If you take care of yourself, you will be able to take better care of your baby and your family. · Talk to a counselor or healthcare provider who has training in  mood and anxiety problems. · Learn as much as you can about pregnancy and postpartum depression and anxiety. · Get support from family and friends. Ask for help when you need it. · Join a support group in your area or online. · Keep active by walking, stretching or whatever form of exercise helps you to feel better. · Get enough rest and time for yourself. · Eat a healthy diet. · Don't give up! It may take more than one try to get the right help you need. These are general instructions for a healthy lifestyle:    No smoking/ No tobacco products/ Avoid exposure to second hand smoke    Surgeon General's Warning:  Quitting smoking now greatly reduces serious risk to your health.     Obesity, smoking, and sedentary lifestyle greatly increases your risk for illness    A healthy diet, regular physical exercise & weight monitoring are important for maintaining a healthy lifestyle    Recognize signs and symptoms of STROKE:    F-face looks uneven    A-arms unable to move or move unevenly    S-speech slurred or non-existent    T-time-call 911 as soon as signs and symptoms begin - DO NOT go       back to bed or wait to see if you get better - TIME IS BRAIN. I have had the opportunity to make my options or choices for discharge. I have received and understand these instructions.

## 2020-02-13 NOTE — DISCHARGE SUMMARY
Obstetrical Discharge Summary     Name: Patria Schmidt MRN: [id number]  SSN: [id number]    YOB: 1995  Age: 22 y.o. Sex: female      Admit Date: 2/10/2020    Discharge Date: 2020    Admitting Physician: Slava Duncan MD     Attending Physician:  Jesse Jimenes MD     Admission Diagnoses: 40 weeks gestation of pregnancy [Z3A.40]    Discharge Diagnoses:   Information for the patient's :  Luz Maria Chavez [[id number]]   Delivery of a 3.075 kg female infant via Vaginal, Spontaneous on 2020 at 4:52 AM  by Joe Harrison. Apgars were 9  and 9 . Additional Diagnoses:   Hospital Problems  Date Reviewed: 2016          Codes Class Noted POA    40 weeks gestation of pregnancy ICD-10-CM: Z3A.40  ICD-9-CM: V22.2  2/10/2020 Unknown             Lab Results   Component Value Date/Time    Rubella, External 1.23 2019    GrBStrep, External negative 2020       Hospital Course: Normal hospital course following the delivery. Patient Instructions:   Current Discharge Medication List      START taking these medications    Details   ibuprofen (MOTRIN) 800 mg tablet Take 1 Tab by mouth every six (6) hours as needed for Pain. Qty: 30 Tab, Refills: 1         STOP taking these medications       DESOGESTREL-ETHINYL ESTRADIOL (RECLIPSEN, 28, PO) Comments:   Reason for Stopping:               Disposition at Discharge: Home or self care    Condition at Discharge: Stable    Reference my discharge instructions.     Follow-up Appointments   Procedures    FOLLOW UP VISIT Appointment in: 6 Weeks     Standing Status:   Standing     Number of Occurrences:   1     Order Specific Question:   Appointment in     Answer:   6 Weeks        Signed By:  Thomas Josue MD     2020

## 2020-02-13 NOTE — ROUTINE PROCESS
Bedside shift change report given to Queenie Willingham RN orienting BRAEDEN Lux RN (oncoming nurse) by Donald Childress. Sharyle Cheng RN (offgoing nurse). Report included the following information SBAR.  
 
0700 I have reviewed documentation and in agreement.

## 2020-02-13 NOTE — LACTATION NOTE
This note was copied from a baby's chart. Mom states baby has been latching and nursing well. Mom was breast feeding when I went in to see her. I gave mom some tips on positioning the baby at the breast and helped her get her latched in the cross cradle hold. Mom was putting her fingers close to the end of her nipple and trying to get baby latched. I showed her how to hold further back on the breast and try to elicit the root reflex but rubbing her nipple under baby's nose. We were able to get the baby latched deeply. She began sucking rhythmically with the occassional swallow noted. Mom will not limit the time the baby is at the breast. She will allow the baby to completely finish one breast and then offer the second breast at each feeding. Mom will call out for assistance as needed.

## 2020-02-14 VITALS
OXYGEN SATURATION: 96 % | HEIGHT: 63 IN | TEMPERATURE: 98.2 F | DIASTOLIC BLOOD PRESSURE: 75 MMHG | SYSTOLIC BLOOD PRESSURE: 108 MMHG | BODY MASS INDEX: 35.44 KG/M2 | RESPIRATION RATE: 18 BRPM | HEART RATE: 80 BPM | WEIGHT: 200 LBS

## 2020-02-14 PROCEDURE — 74011250637 HC RX REV CODE- 250/637: Performed by: MIDWIFE

## 2020-02-14 RX ADMIN — IBUPROFEN 800 MG: 400 TABLET, FILM COATED ORAL at 08:39

## 2020-02-14 NOTE — ROUTINE PROCESS
Bedside shift change report given to Irina Olivares RN (oncoming nurse) by Mackenzie Mcnair RN (offgoing nurse). Report included the following information SBAR.

## 2020-02-14 NOTE — LACTATION NOTE
This note was copied from a baby's chart. Baby nursing well and has improved throughout post partum stay, deep latch maintained, mother is comfortable, milk is in transition, baby feeding vigorously with rhythmic suck, swallow, breathe pattern, with audible swallowing, and evident milk transfer, both breasts offerd, baby is asleep following feeding. Baby is feeding on demand, voiding and stools present as appropriate over the last 24 hours. Mother states that she has no further questions for Lactation Consultant before discharge.

## 2020-02-14 NOTE — PROGRESS NOTES
Post-Partum Day Number 2 Progress Note    Yudelka Ahmadi     Assessment: Doing well, post partum day 2    Plan:   1. Discharge home today  2. Follow up in office in 6 weeks with Vita Thibodeaux MD  3. Post partum activity advised, diet as tolerated  4. Discharge Medications: ibuprofen, and medications prior to admission    Information for the patient's :  Robin Ling [[id number]]   Vaginal, Spontaneous   Patient doing well without significant complaint. Voiding without difficulty, normal lochia. Vitals:  Visit Vitals  /75   Pulse 80   Temp 98.2 °F (36.8 °C)   Resp 18   Ht 5' 3\" (1.6 m)   Wt 90.7 kg (200 lb)   SpO2 96%   Breastfeeding Yes   BMI 35.43 kg/m²     Temp (24hrs), Av.3 °F (36.8 °C), Min:98 °F (36.7 °C), Max:98.5 °F (36.9 °C)      Exam:         Patient without distress. Abdomen soft, fundus firm, nontender                 Lower extremities are negative for swelling, cords or tenderness. Labs:     Lab Results   Component Value Date/Time    WBC 11.8 (H) 02/10/2020 04:38 PM    HGB 11.7 02/10/2020 04:38 PM    HCT 37.9 02/10/2020 04:38 PM    PLATELET 345  04:38 PM       No results found for this or any previous visit (from the past 24 hour(s)).

## 2020-12-01 NOTE — PROGRESS NOTES
1930: Bedside and Verbal shift change report given to PITO Zuleta RN (oncoming nurse) by MAYO Lopez (offgoing nurse). Report included the following information SBAR, Intake/Output, MAR and Recent Results. 0445: EFM removed. Cervidil removed and discarded by pt, pt up to shower. 0545: Pt back to bed, EFM reapplied. 2580: Pitocin started. 8783: Bedside and Verbal shift change report given to ASHELY Plaza RN (oncoming nurse) by [employer]. Mirna Zuleta RN (offgoing nurse). Report included the following information SBAR, Intake/Output, MAR and Recent Results. Patient has not been here in a long time and she has never seen the provider before. She will need to wait until her appt to discuss

## 2021-10-07 ENCOUNTER — APPOINTMENT (OUTPATIENT)
Dept: GENERAL RADIOLOGY | Age: 26
End: 2021-10-07
Attending: PHYSICIAN ASSISTANT
Payer: COMMERCIAL

## 2021-10-07 ENCOUNTER — HOSPITAL ENCOUNTER (EMERGENCY)
Age: 26
Discharge: HOME | End: 2021-10-07
Attending: EMERGENCY MEDICINE
Payer: COMMERCIAL

## 2021-10-07 VITALS
WEIGHT: 220 LBS | OXYGEN SATURATION: 99 % | RESPIRATION RATE: 18 BRPM | BODY MASS INDEX: 38.98 KG/M2 | SYSTOLIC BLOOD PRESSURE: 121 MMHG | TEMPERATURE: 96.8 F | HEIGHT: 63 IN | DIASTOLIC BLOOD PRESSURE: 77 MMHG | HEART RATE: 93 BPM

## 2021-10-07 DIAGNOSIS — R07.9 CHEST PAIN, UNSPECIFIED TYPE: ICD-10-CM

## 2021-10-07 DIAGNOSIS — R00.2 PALPITATIONS: Primary | ICD-10-CM

## 2021-10-07 DIAGNOSIS — F41.1 ANXIETY STATE: ICD-10-CM

## 2021-10-07 LAB
ALBUMIN SERPL-MCNC: 3.6 G/DL (ref 3.5–5)
ALBUMIN/GLOB SERPL: 0.8 {RATIO} (ref 1.1–2.2)
ALP SERPL-CCNC: 88 U/L (ref 45–117)
ALT SERPL-CCNC: 30 U/L (ref 12–78)
ANION GAP SERPL CALC-SCNC: 9 MMOL/L (ref 5–15)
AST SERPL-CCNC: 16 U/L (ref 15–37)
ATRIAL RATE: 89 BPM
BASOPHILS # BLD: 0.1 K/UL (ref 0–0.1)
BASOPHILS NFR BLD: 0 % (ref 0–1)
BILIRUB SERPL-MCNC: 0.3 MG/DL (ref 0.2–1)
BUN SERPL-MCNC: 11 MG/DL (ref 6–20)
BUN/CREAT SERPL: 14 (ref 12–20)
CALCIUM SERPL-MCNC: 9.3 MG/DL (ref 8.5–10.1)
CALCULATED P AXIS, ECG09: 39 DEGREES
CALCULATED R AXIS, ECG10: 58 DEGREES
CALCULATED T AXIS, ECG11: 34 DEGREES
CHLORIDE SERPL-SCNC: 105 MMOL/L (ref 97–108)
CO2 SERPL-SCNC: 24 MMOL/L (ref 21–32)
COMMENT, HOLDF: NORMAL
CREAT SERPL-MCNC: 0.79 MG/DL (ref 0.55–1.02)
D DIMER PPP FEU-MCNC: 0.58 MG/L FEU (ref 0–0.65)
DIAGNOSIS, 93000: NORMAL
DIFFERENTIAL METHOD BLD: ABNORMAL
EOSINOPHIL # BLD: 0.1 K/UL (ref 0–0.4)
EOSINOPHIL NFR BLD: 1 % (ref 0–7)
ERYTHROCYTE [DISTWIDTH] IN BLOOD BY AUTOMATED COUNT: 13.1 % (ref 11.5–14.5)
GLOBULIN SER CALC-MCNC: 4.7 G/DL (ref 2–4)
GLUCOSE SERPL-MCNC: 85 MG/DL (ref 65–100)
HCG SERPL QL: NEGATIVE
HCT VFR BLD AUTO: 44.8 % (ref 35–47)
HGB BLD-MCNC: 14.1 G/DL (ref 11.5–16)
IMM GRANULOCYTES # BLD AUTO: 0 K/UL (ref 0–0.04)
IMM GRANULOCYTES NFR BLD AUTO: 0 % (ref 0–0.5)
LYMPHOCYTES # BLD: 3.1 K/UL (ref 0.8–3.5)
LYMPHOCYTES NFR BLD: 25 % (ref 12–49)
MCH RBC QN AUTO: 28.2 PG (ref 26–34)
MCHC RBC AUTO-ENTMCNC: 31.5 G/DL (ref 30–36.5)
MCV RBC AUTO: 89.6 FL (ref 80–99)
MONOCYTES # BLD: 0.5 K/UL (ref 0–1)
MONOCYTES NFR BLD: 4 % (ref 5–13)
NEUTS SEG # BLD: 8.7 K/UL (ref 1.8–8)
NEUTS SEG NFR BLD: 70 % (ref 32–75)
NRBC # BLD: 0 K/UL (ref 0–0.01)
NRBC BLD-RTO: 0 PER 100 WBC
P-R INTERVAL, ECG05: 126 MS
PLATELET # BLD AUTO: 265 K/UL (ref 150–400)
PMV BLD AUTO: 12.2 FL (ref 8.9–12.9)
POTASSIUM SERPL-SCNC: 3.8 MMOL/L (ref 3.5–5.1)
PROT SERPL-MCNC: 8.3 G/DL (ref 6.4–8.2)
Q-T INTERVAL, ECG07: 370 MS
QRS DURATION, ECG06: 72 MS
QTC CALCULATION (BEZET), ECG08: 450 MS
RBC # BLD AUTO: 5 M/UL (ref 3.8–5.2)
SAMPLES BEING HELD,HOLD: NORMAL
SODIUM SERPL-SCNC: 138 MMOL/L (ref 136–145)
TROPONIN-HIGH SENSITIVITY: <4 NG/L (ref 0–51)
TSH SERPL DL<=0.05 MIU/L-ACNC: 3.51 UIU/ML (ref 0.36–3.74)
VENTRICULAR RATE, ECG03: 89 BPM
WBC # BLD AUTO: 12.5 K/UL (ref 3.6–11)

## 2021-10-07 PROCEDURE — 71046 X-RAY EXAM CHEST 2 VIEWS: CPT

## 2021-10-07 PROCEDURE — 36415 COLL VENOUS BLD VENIPUNCTURE: CPT

## 2021-10-07 PROCEDURE — 93005 ELECTROCARDIOGRAM TRACING: CPT

## 2021-10-07 PROCEDURE — 84443 ASSAY THYROID STIM HORMONE: CPT

## 2021-10-07 PROCEDURE — 84703 CHORIONIC GONADOTROPIN ASSAY: CPT

## 2021-10-07 PROCEDURE — 80053 COMPREHEN METABOLIC PANEL: CPT

## 2021-10-07 PROCEDURE — 85379 FIBRIN DEGRADATION QUANT: CPT

## 2021-10-07 PROCEDURE — 99283 EMERGENCY DEPT VISIT LOW MDM: CPT

## 2021-10-07 PROCEDURE — 85025 COMPLETE CBC W/AUTO DIFF WBC: CPT

## 2021-10-07 PROCEDURE — 84484 ASSAY OF TROPONIN QUANT: CPT

## 2021-10-07 RX ORDER — IBUPROFEN 800 MG/1
800 TABLET ORAL
Qty: 20 TABLET | Refills: 0 | Status: SHIPPED | OUTPATIENT
Start: 2021-10-07

## 2021-10-07 RX ORDER — HYDROXYZINE PAMOATE 25 MG/1
25-50 CAPSULE ORAL
Qty: 12 CAPSULE | Refills: 0 | Status: SHIPPED | OUTPATIENT
Start: 2021-10-07

## 2021-10-07 NOTE — ED PROVIDER NOTES
27yo with PMH of palpitations since 2016 presents with continued palpitations that have incrased in frequency the past 2 weeks. Previously was once every 1-2 weeks but now once a day. She states when she lays in the fetal position it occurs and states today it still feels \"not right\" and deep pain on the left side of the chest 11am today while she was unloading boxes at work. She endorses \"when my anxiety is up I feel the palpitations more\". She states she has been feeling increased anxiety. No headache, fever, cough, URI sx's, chest congestion, chest pain, N/V/D. Later states pain is also reproducible when rolling over in bed. Saw cardiology Dr. Lian Kendrick 5/2016, and she did a 30 day holter monitor and was told it was normal with no follow-up.     LMP- 9/16/21, denies pregnancy chance; takes an estrogen pill           Past Medical History:   Diagnosis Date    Phlebitis and thrombophlebitis        Past Surgical History:   Procedure Laterality Date    DELIVERY VAGINAL  2/12/2020         HX OTHER SURGICAL  2019    R Wrist FX         Family History:   Problem Relation Age of Onset    Heart Disease Father         ? jproblem in his 42's, detaills unknown       Social History     Socioeconomic History    Marital status: SINGLE     Spouse name: Not on file    Number of children: Not on file    Years of education: Not on file    Highest education level: Not on file   Occupational History    Not on file   Tobacco Use    Smoking status: Never Smoker   Substance and Sexual Activity    Alcohol use: Not Currently     Alcohol/week: 0.0 standard drinks    Drug use: Never    Sexual activity: Yes     Partners: Male     Birth control/protection: Pill   Other Topics Concern     Service Not Asked    Blood Transfusions Not Asked    Caffeine Concern Not Asked    Occupational Exposure Not Asked    Hobby Hazards Not Asked    Sleep Concern Not Asked    Stress Concern Not Asked    Weight Concern Not Asked    Special Diet Not Asked    Back Care Not Asked    Exercise Not Asked    Bike Helmet Not Asked   [address] Not Asked    Self-Exams Not Asked   Social History Narrative    Not on file     Social Determinants of Health     Financial Resource Strain:     Difficulty of Paying Living Expenses:    Food Insecurity:     Worried About Running Out of Food in the Last Year:     Ran Out of Food in the Last Year:    Transportation Needs:     Lack of Transportation (Medical):  Lack of Transportation (Non-Medical):    Physical Activity:     Days of Exercise per Week:     Minutes of Exercise per Session:    Stress:     Feeling of Stress :    Social Connections:     Frequency of Communication with Friends and Family:     Frequency of Social Gatherings with Friends and Family:     Attends [employer]oration[employer] Services:     Active Member of Clubs or Organizations:     Attends Club or Organization Meetings:     Marital Status:    Intimate Partner Violence:     Fear of Current or Ex-Partner:     Emotionally Abused:     Physically Abused:     Sexually Abused: ALLERGIES: Patient has no known allergies. Review of Systems    Vitals:    10/07/21 1243   BP: 121/77   Pulse: 93   Resp: 18   Temp: 96.8 °F (36 °C)   SpO2: 99%   Weight: 99.8 kg (220 lb)   Height: 5' 3\" (1.6 m)            Physical Exam  Vitals and nursing note reviewed. Constitutional:       General: She is not in acute distress. Appearance: She is well-developed. She is not toxic-appearing or diaphoretic. Comments: WF, elevated BMI   HENT:      Head: Normocephalic and atraumatic. Eyes:      General:         Right eye: No discharge. Left eye: No discharge. Conjunctiva/sclera: Conjunctivae normal.      Pupils: Pupils are equal, round, and reactive to light. Neck:      Trachea: No tracheal tenderness. Cardiovascular:      Rate and Rhythm: Normal rate and regular rhythm. Pulses: Normal pulses.       Heart sounds: Normal heart sounds. No murmur heard. No friction rub. No gallop. Pulmonary:      Effort: Pulmonary effort is normal. No respiratory distress. Breath sounds: Normal breath sounds. No wheezing or rales. Chest:      Chest wall: Tenderness present. Abdominal:      General: Bowel sounds are normal. There is no distension. Palpations: Abdomen is soft. Tenderness: There is no abdominal tenderness. There is no guarding or rebound. Musculoskeletal:         General: No tenderness. Normal range of motion. Cervical back: Full passive range of motion without pain and normal range of motion. Skin:     General: Skin is warm and dry. Capillary Refill: Capillary refill takes less than 2 seconds. Findings: No abrasion, erythema or rash. Neurological:      Mental Status: She is alert and oriented to person, place, and time. Cranial Nerves: No cranial nerve deficit. Sensory: No sensory deficit. Coordination: Coordination normal.   Psychiatric:         Speech: Speech normal.         Behavior: Behavior normal.          MDM  Number of Diagnoses or Management Options  Anxiety state  Chest pain, unspecified type  Palpitations  Diagnosis management comments:   Ddx: musculoskeletal chest pain, ACS, arrhythmia, PNA, viral illness       Amount and/or Complexity of Data Reviewed  Clinical lab tests: ordered and reviewed  Tests in the radiology section of CPT®: ordered and reviewed  Review and summarize past medical records: yes  Independent visualization of images, tracings, or specimens: yes    Patient Progress  Patient progress: stable         Procedures      EKG interpretation:   Rhythm: normal sinus rhythm; and regular . Rate (approx.): 89; Axis: normal; P wave: normal; QRS interval: normal ; ST/T wave: normal;       DISCHARGE NOTE:  6:00 PM  The patient has been re-evaluated and feeling much better and are stable for discharge.   All available radiology and laboratory results have been reviewed with patient and/or available family. Patient and/or family verbally conveyed their understanding and agreement of the patient's signs, symptoms, diagnosis, treatment and prognosis and additionally agree to follow-up as recommended in the discharge instructions or to return to the Emergency Department should their condition change or worsen prior to their follow-up appointment. All questions have been answered and patient and/or available family express understanding. LABORATORY RESULTS:  Recent Results (from the past 12 hour(s))   EKG, 12 LEAD, INITIAL    Collection Time: 10/07/21 12:53 PM   Result Value Ref Range    Ventricular Rate 89 BPM    Atrial Rate 89 BPM    P-R Interval 126 ms    QRS Duration 72 ms    Q-T Interval 370 ms    QTC Calculation (Bezet) 450 ms    Calculated P Axis 39 degrees    Calculated R Axis 58 degrees    Calculated T Axis 34 degrees    Diagnosis       Normal sinus rhythm  Normal ECG  No previous ECGs available  Confirmed by Koby Mora M.D., Alison Pacas ([id number]) on 10/7/2021 4:27:25 PM     SAMPLES BEING HELD    Collection Time: 10/07/21 12:59 PM   Result Value Ref Range    SAMPLES BEING HELD 1red     COMMENT        Add-on orders for these samples will be processed based on acceptable specimen integrity and analyte stability, which may vary by analyte. CBC WITH AUTOMATED DIFF    Collection Time: 10/07/21 12:59 PM   Result Value Ref Range    WBC 12.5 (H) 3.6 - 11.0 K/uL    RBC 5.00 3.80 - 5.20 M/uL    HGB 14.1 11.5 - 16.0 g/dL    HCT 44.8 35.0 - 47.0 %    MCV 89.6 80.0 - 99.0 FL    MCH 28.2 26.0 - 34.0 PG    MCHC 31.5 30.0 - 36.5 g/dL    RDW 13.1 11.5 - 14.5 %    PLATELET 403 621 - 470 K/uL    MPV 12.2 8.9 - 12.9 FL    NRBC 0.0 0  WBC    ABSOLUTE NRBC 0.00 0.00 - 0.01 K/uL    NEUTROPHILS 70 32 - 75 %    LYMPHOCYTES 25 12 - 49 %    MONOCYTES 4 (L) 5 - 13 %    EOSINOPHILS 1 0 - 7 %    BASOPHILS 0 0 - 1 %    IMMATURE GRANULOCYTES 0 0.0 - 0.5 %    ABS.  NEUTROPHILS 8.7 (H) 1.8 - 8.0 K/UL    ABS. LYMPHOCYTES 3.1 0.8 - 3.5 K/UL    ABS. MONOCYTES 0.5 0.0 - 1.0 K/UL    ABS. EOSINOPHILS 0.1 0.0 - 0.4 K/UL    ABS. BASOPHILS 0.1 0.0 - 0.1 K/UL    ABS. IMM. GRANS. 0.0 0.00 - 0.04 K/UL    DF AUTOMATED     TROPONIN-HIGH SENSITIVITY    Collection Time: 10/07/21 12:59 PM   Result Value Ref Range    Troponin-High Sensitivity <4 0 - 51 ng/L   METABOLIC PANEL, COMPREHENSIVE    Collection Time: 10/07/21 12:59 PM   Result Value Ref Range    Sodium 138 136 - 145 mmol/L    Potassium 3.8 3.5 - 5.1 mmol/L    Chloride 105 97 - 108 mmol/L    CO2 24 21 - 32 mmol/L    Anion gap 9 5 - 15 mmol/L    Glucose 85 65 - 100 mg/dL    BUN 11 6 - 20 MG/DL    Creatinine 0.79 0.55 - 1.02 MG/DL    BUN/Creatinine ratio 14 12 - 20      GFR est AA >60 >60 ml/min/1.73m2    GFR est non-AA >60 >60 ml/min/1.73m2    Calcium 9.3 8.5 - 10.1 MG/DL    Bilirubin, total 0.3 0.2 - 1.0 MG/DL    ALT (SGPT) 30 12 - 78 U/L    AST (SGOT) 16 15 - 37 U/L    Alk. phosphatase 88 45 - 117 U/L    Protein, total 8.3 (H) 6.4 - 8.2 g/dL    Albumin 3.6 3.5 - 5.0 g/dL    Globulin 4.7 (H) 2.0 - 4.0 g/dL    A-G Ratio 0.8 (L) 1.1 - 2.2     TSH 3RD GENERATION    Collection Time: 10/07/21 12:59 PM   Result Value Ref Range    TSH 3.51 0.36 - 3.74 uIU/mL   HCG QL SERUM    Collection Time: 10/07/21 12:59 PM   Result Value Ref Range    HCG, Ql. Negative NEG     D DIMER    Collection Time: 10/07/21  1:34 PM   Result Value Ref Range    D-dimer 0.58 0.00 - 0.65 mg/L FEU       IMAGING RESULTS:  XR CHEST PA LAT    Result Date: 10/7/2021  No acute process. IMPRESSION:  1. Palpitations    2. Chest pain, unspecified type    3.  Anxiety state        PLAN:  Follow-up Information     Follow up With Specialties Details Why Contact Info    Apoorva Feng NP Nurse Practitioner Schedule an appointment as soon as possible for a visit   [address]  [address] [place] [address] St Yazmin Bobo MD Cardiology Schedule an appointment as soon as possible for a visit  cardiologist for follow-up. [place] [address]  [address]  [phone]          Discharge Medication List as of 10/7/2021  4:26 PM      START taking these medications    Details   !! ibuprofen (MOTRIN) 800 mg tablet Take 1 Tablet by mouth every eight (8) hours as needed for Pain., Print, Disp-20 Tablet, R-0      hydrOXYzine pamoate (VISTARIL) 25 mg capsule Take 1-2 Capsules by mouth three (3) times daily as needed for Anxiety. , Print, Disp-12 Capsule, R-0       !! - Potential duplicate medications found. Please discuss with provider. CONTINUE these medications which have NOT CHANGED    Details   !! ibuprofen (MOTRIN) 800 mg tablet Take 1 Tab by mouth every six (6) hours as needed for Pain., Print, Disp-30 Tab, R-1       !! - Potential duplicate medications found. Please discuss with provider.

## 2021-10-07 NOTE — ED TRIAGE NOTES
Triage note: pt has been having palpations and chest pain for past few week. Pt with history of anxiety.

## 2021-10-07 NOTE — LETTER
[address]  [address] [facility] [address]  [phone]    Work/School Note    Date: 10/7/2021    To Whom It May concern:    Melani Jiménez was seen and treated today in the emergency room by the following provider(s):  Attending Provider: Nevin Jain MD  Physician Assistant: Cara Saavedra PA-C. Please excuse Melani Jiménez from work 10/07/21 and 10/08/21.       Sincerely,          Mar Napoles PA-C

## 2022-03-20 PROBLEM — Z3A.40 40 WEEKS GESTATION OF PREGNANCY: Status: ACTIVE | Noted: 2020-02-10

## 2023-05-18 RX ORDER — HYDROXYZINE PAMOATE 25 MG/1
25-50 CAPSULE ORAL 3 TIMES DAILY PRN
COMMUNITY
Start: 2021-10-07

## 2023-05-18 RX ORDER — IBUPROFEN 800 MG/1
800 TABLET ORAL EVERY 8 HOURS PRN
COMMUNITY
Start: 2020-02-13